# Patient Record
Sex: FEMALE | Race: BLACK OR AFRICAN AMERICAN | NOT HISPANIC OR LATINO | Employment: OTHER | URBAN - METROPOLITAN AREA
[De-identification: names, ages, dates, MRNs, and addresses within clinical notes are randomized per-mention and may not be internally consistent; named-entity substitution may affect disease eponyms.]

---

## 2019-07-12 ENCOUNTER — TELEPHONE (OUTPATIENT)
Dept: GASTROENTEROLOGY | Facility: CLINIC | Age: 69
End: 2019-07-12

## 2019-07-12 NOTE — TELEPHONE ENCOUNTER
Called pt to see if they every had a colonoscopy  She did, back in 2012  Pt does not remember where she had it done, she has tried in the past to get records

## 2019-07-16 ENCOUNTER — OFFICE VISIT (OUTPATIENT)
Dept: GASTROENTEROLOGY | Facility: AMBULARY SURGERY CENTER | Age: 69
End: 2019-07-16
Payer: COMMERCIAL

## 2019-07-16 VITALS
BODY MASS INDEX: 29.45 KG/M2 | RESPIRATION RATE: 16 BRPM | HEART RATE: 61 BPM | DIASTOLIC BLOOD PRESSURE: 60 MMHG | SYSTOLIC BLOOD PRESSURE: 116 MMHG | WEIGHT: 156 LBS | HEIGHT: 61 IN | TEMPERATURE: 97.2 F

## 2019-07-16 DIAGNOSIS — R13.19 INTERMITTENT DYSPHAGIA: ICD-10-CM

## 2019-07-16 DIAGNOSIS — K21.9 GASTROESOPHAGEAL REFLUX DISEASE, ESOPHAGITIS PRESENCE NOT SPECIFIED: ICD-10-CM

## 2019-07-16 DIAGNOSIS — Z12.11 COLON CANCER SCREENING: Primary | ICD-10-CM

## 2019-07-16 PROCEDURE — 99204 OFFICE O/P NEW MOD 45 MIN: CPT | Performed by: INTERNAL MEDICINE

## 2019-07-16 RX ORDER — MECLIZINE HYDROCHLORIDE 25 MG/1
25 TABLET ORAL DAILY PRN
COMMUNITY

## 2019-07-16 RX ORDER — CLONAZEPAM 0.5 MG/1
TABLET, ORALLY DISINTEGRATING ORAL
Refills: 2 | COMMUNITY
Start: 2019-04-18

## 2019-07-16 RX ORDER — SIMETHICONE 125 MG
CAPSULE ORAL
COMMUNITY
End: 2020-07-17 | Stop reason: ALTCHOICE

## 2019-07-16 RX ORDER — CALCIUM CARBONATE/VITAMIN D2 250 MG-125
TABLET ORAL
COMMUNITY
Start: 2019-05-29

## 2019-07-16 RX ORDER — HYDROCODONE BITARTRATE AND ACETAMINOPHEN 5; 325 MG/1; MG/1
TABLET ORAL
COMMUNITY
End: 2019-10-28 | Stop reason: ALTCHOICE

## 2019-07-16 RX ORDER — LEVETIRACETAM 750 MG/1
750 TABLET ORAL 2 TIMES DAILY
COMMUNITY
End: 2020-10-09 | Stop reason: SDUPTHER

## 2019-07-16 RX ORDER — MULTIVIT-MIN/IRON/FOLIC ACID/K 18-600-40
CAPSULE ORAL
COMMUNITY

## 2019-07-16 RX ORDER — CHOLECALCIFEROL (VITAMIN D3) 125 MCG
CAPSULE ORAL
COMMUNITY

## 2019-07-16 RX ORDER — GUAIFENESIN AND CODEINE PHOSPHATE 100; 10 MG/5ML; MG/5ML
SOLUTION ORAL
COMMUNITY
End: 2019-10-28 | Stop reason: ALTCHOICE

## 2019-07-16 RX ORDER — RIBOFLAVIN (VITAMIN B2) 100 MG
TABLET ORAL
COMMUNITY
Start: 2019-05-29

## 2019-07-16 RX ORDER — LAMOTRIGINE 200 MG/1
TABLET ORAL
COMMUNITY
End: 2020-10-05 | Stop reason: SDUPTHER

## 2019-07-16 RX ORDER — VITAMINS A AND D
CAPSULE ORAL
COMMUNITY
End: 2020-07-17 | Stop reason: SDUPTHER

## 2019-07-16 RX ORDER — TRAMADOL HYDROCHLORIDE 50 MG/1
TABLET ORAL
COMMUNITY
End: 2020-07-17 | Stop reason: ALTCHOICE

## 2019-07-16 NOTE — PROGRESS NOTES
Consultation - [facility]  Juan Guerra 80 y o  female MRN: [id number]  Unit/Bed#:  Encounter: [id number]        Consults    ASSESSMENT/PLAN:       1  History of colon polyps-will schedule for increased risk screening colonoscopy  Patient is currently on Eliquis for a flutter, will need to obtain clearance from Cardiology in regards to holding this 2 days prior to the procedure  -    Patient was explained about  the risks and benefits of the procedure  Risks including but not limited to bleeding, infection, perforation were explained in detail  Also explained about less than 100% sensitivity with the exam and other alternatives  2  Intermittent dysphagia to solids-differential includes esophageal dysmotility versus peptic stricture versus Schatzki's ring versus less likely malignancy   -discussed eating small meals, chewing well and following with water   -patient reluctant about starting another medication, will wait endoscopy prior to starting PPI  -avoid NSAIDs   -continue to follow anti-reflux measures   -will plan for EGD to assess for etiology of dysphagia       ______________________________________________________________________    Reason for Consult / Principal Problem: [unfilled]    HPI: Juan Guerra is a 80y o  year old female with history of acid reflux, hypertension, a flutter, on Eliquis, vertigo, history of colon polyps and osteoporosis presents for colon cancer screening evaluation  Patient states that she had a colonoscopy in 2012 and was noted to have polyps, she states that she was recommended repeat colonoscopy at 5 year interval which she has not had  She denies change in bowel habits, hematochezia, abdominal pain or unintentional weight loss  She further endorses that she has had history of acid reflux which was well controlled with Nexium in the past   She states that she stopped taking Nexium several years ago    She states that her heartburn symptoms are relatively well controlled however she does have a intermittent episodes of dysphagia to solids  She states that she notices have bread can often gets stuck in the esophagus  She denies hematemesis, coffee-ground emesis or melena  Most recent blood work was done by her PCP and was notable for normal hemoglobin of 12 7, platelets of 870, WBC of 6 1  Magnesium was 1 8   TSH was 0 63  Her kidney function was normal     Review of Systems: The remainder of the review of systems was negative except for the pertinent positives noted in HPI  Historical Information   Past Medical History:   Diagnosis Date    Atrial flutter ([address] )     Colon polyp     GERD (gastroesophageal reflux disease)     Hypertension     Osteoporosis     Vertigo      Past Surgical History:   Procedure Laterality Date    ECTOPIC PREGNANCY SURGERY       Social History   Social History     Substance and Sexual Activity   Alcohol Use Yes    Comment: social     Social History     Substance and Sexual Activity   Drug Use Never     Social History     Tobacco Use   Smoking Status Never Smoker   Smokeless Tobacco Never Used     Family History   Problem Relation Age of Onset    Stomach cancer Father        Meds/Allergies       (Not in a hospital admission)  No current facility-administered medications for this visit  No Known Allergies    Objective     Blood pressure 116/60, pulse 61, temperature (!) 97 2 °F (36 2 °C), temperature source Tympanic, resp  rate 16, height 5' 1" (1 549 m), weight 70 8 kg (156 lb)  [unfilled]    PHYSICAL EXAM     GEN: well nourished, well developed, no acute distress  HEENT: anicteric, MMM, no cervical or supraclavicular lymphadenopathy  CV: RRR, no m/r/g  CHEST: CTA b/l, no WRR  ABD: +BS, soft, NT/ND, no hepatosplenomegaly  EXT: no c/c/e  SKIN: no rashes,  NEURO: aaox3    Lab Results:   No visits with results within 1 Day(s) from this visit     Latest known visit with results is:   No results found for any previous visit       Imaging Studies: I have personally reviewed pertinent films in PACS

## 2019-07-16 NOTE — LETTER
July 16, 2019     Shamar Muñoz MD  [facility] [id number]    Patient: Lorena Wilson   YOB: 1950   Date of Visit: 7/16/2019       Dear Dr Rebel Ibrahim: Thank you for referring Lorena Wilson to me for evaluation  Below are my notes for this consultation  If you have questions, please do not hesitate to call me  I look forward to following your patient along with you  Sincerely,        Jannet Garcia MD        CC: No Recipients  Jannet Garcia MD  7/16/2019  1:30 PM  Sign at close encounter  Consultation - [address] [facility]  Lorena Wilson 80 y o  female MRN: [id number]  Unit/Bed#:  Encounter: [id number]        Consults    ASSESSMENT/PLAN:       1  History of colon polyps-will schedule for increased risk screening colonoscopy  Patient is currently on Eliquis for a flutter, will need to obtain clearance from Cardiology in regards to holding this 2 days prior to the procedure  -    Patient was explained about  the risks and benefits of the procedure  Risks including but not limited to bleeding, infection, perforation were explained in detail  Also explained about less than 100% sensitivity with the exam and other alternatives  2  Intermittent dysphagia to solids-differential includes esophageal dysmotility versus peptic stricture versus Schatzki's ring versus less likely malignancy   -discussed eating small meals, chewing well and following with water   -patient reluctant about starting another medication, will wait endoscopy prior to starting PPI    -avoid NSAIDs   -continue to follow anti-reflux measures   -will plan for EGD to assess for etiology of dysphagia       ______________________________________________________________________    Reason for Consult / Principal Problem: [unfilled]    HPI: Lorena Wilson is a 80y o  year old female with history of acid reflux, hypertension, a flutter, on Eliquis, vertigo, history of colon polyps and osteoporosis presents for colon cancer screening evaluation  Patient states that she had a colonoscopy in 2012 and was noted to have polyps, she states that she was recommended repeat colonoscopy at 5 year interval which she has not had  She denies change in bowel habits, hematochezia, abdominal pain or unintentional weight loss  She further endorses that she has had history of acid reflux which was well controlled with Nexium in the past   She states that she stopped taking Nexium several years ago  She states that her heartburn symptoms are relatively well controlled however she does have a intermittent episodes of dysphagia to solids  She states that she notices have bread can often gets stuck in the esophagus  She denies hematemesis, coffee-ground emesis or melena  Most recent blood work was done by her PCP and was notable for normal hemoglobin of 12 7, platelets of 931, WBC of 6 1  Magnesium was 1 8   TSH was 0 63  Her kidney function was normal     Review of Systems: The remainder of the review of systems was negative except for the pertinent positives noted in HPI  Historical Information   Past Medical History:   Diagnosis Date    Atrial flutter ([address]     Colon polyp     GERD (gastroesophageal reflux disease)     Hypertension     Osteoporosis     Vertigo      Past Surgical History:   Procedure Laterality Date    ECTOPIC PREGNANCY SURGERY       Social History   Social History     Substance and Sexual Activity   Alcohol Use Yes    Comment: social     Social History     Substance and Sexual Activity   Drug Use Never     Social History     Tobacco Use   Smoking Status Never Smoker   Smokeless Tobacco Never Used     Family History   Problem Relation Age of Onset    Stomach cancer Father        Meds/Allergies       (Not in a hospital admission)  No current facility-administered medications for this visit          No Known Allergies    Objective     Blood pressure 116/60, pulse 61, temperature Fransico Candelario ) 97 2 °F (36 2 °C), temperature source Tympanic, resp  rate 16, height 5' 1" (1 549 m), weight 70 8 kg (156 lb)  [unfilled]    PHYSICAL EXAM     GEN: well nourished, well developed, no acute distress  HEENT: anicteric, MMM, no cervical or supraclavicular lymphadenopathy  CV: RRR, no m/r/g  CHEST: CTA b/l, no WRR  ABD: +BS, soft, NT/ND, no hepatosplenomegaly  EXT: no c/c/e  SKIN: no rashes,  NEURO: aaox3    Lab Results:   No visits with results within 1 Day(s) from this visit  Latest known visit with results is:   No results found for any previous visit       Imaging Studies: I have personally reviewed pertinent films in PACS

## 2019-07-18 ENCOUNTER — TELEPHONE (OUTPATIENT)
Dept: GASTROENTEROLOGY | Facility: AMBULARY SURGERY CENTER | Age: 69
End: 2019-07-18

## 2019-09-23 ENCOUNTER — RX ONLY (RX ONLY)
Age: 69
End: 2019-09-23

## 2019-09-23 ENCOUNTER — DOCTOR'S OFFICE (OUTPATIENT)
Dept: URBAN - METROPOLITAN AREA CLINIC 137 | Facility: CLINIC | Age: 69
Setting detail: OPHTHALMOLOGY
End: 2019-09-23
Payer: COMMERCIAL

## 2019-09-23 DIAGNOSIS — H52.13: ICD-10-CM

## 2019-09-23 DIAGNOSIS — H52.4: ICD-10-CM

## 2019-09-23 DIAGNOSIS — H52.223: ICD-10-CM

## 2019-09-23 DIAGNOSIS — H18.413: ICD-10-CM

## 2019-09-23 PROCEDURE — 92310 CONTACT LENS FITTING OU: CPT | Performed by: OPTOMETRIST

## 2019-09-23 PROCEDURE — 92002 INTRM OPH EXAM NEW PATIENT: CPT | Performed by: OPTOMETRIST

## 2019-09-23 PROCEDURE — 92015 DETERMINE REFRACTIVE STATE: CPT | Performed by: OPTOMETRIST

## 2019-09-23 ASSESSMENT — KERATOMETRY
OD_K1POWER_DIOPTERS: 43.50
OS_K1POWER_DIOPTERS: 42.75
OD_K2POWER_DIOPTERS: 43.50
OD_AXISANGLE_DEGREES: 90
OS_K2POWER_DIOPTERS: 43.75
OS_AXISANGLE_DEGREES: 151

## 2019-09-23 ASSESSMENT — REFRACTION_CURRENTRX
OS_OVR_VA: 20/
OD_ADD: +2.00
OD_OVR_VA: 20/
OS_ADD: +2.00
OD_VPRISM_DIRECTION: PROGS
OD_SPHERE: -3.00
OD_CYLINDER: -0.50
OS_VPRISM_DIRECTION: PROGS
OS_ADD: +1.25
OS_OVR_VA: 20/
OS_AXIS: 167
OS_SPHERE: -5.50
OD_AXIS: 19
OD_VPRISM_DIRECTION: PROGS
OS_VPRISM_DIRECTION: PROGS
OS_CYLINDER: -1.25
OD_SPHERE: -4.25
OD_OVR_VA: 20/
OD_AXIS: 71
OS_CYLINDER: -1.75
OS_SPHERE: -4.50
OS_AXIS: 146
OD_OVR_VA: 20/
OD_CYLINDER: -1.50
OS_OVR_VA: 20/
OD_ADD: +1.25

## 2019-09-23 ASSESSMENT — CONFRONTATIONAL VISUAL FIELD TEST (CVF)
OD_FINDINGS: FULL
OS_FINDINGS: FULL

## 2019-09-23 ASSESSMENT — VISUAL ACUITY
OS_BCVA: 20/50
OD_BCVA: 20/300

## 2019-09-23 ASSESSMENT — REFRACTION_MANIFEST
OS_SPHERE: -4.25
OU_VA: 20/
OS_ADD: +2.50
OS_VA2: 20/
OS_VA2: 20/
OS_VA1: 20/20
OD_VA3: 20/
OD_ADD: +2.50
OD_CYLINDER: -0.25
OD_VA3: 20/
OD_VA1: 20/
OS_CYLINDER: -1.25
OS_VA3: 20/
OD_VA1: 20/20
OD_VA2: 20/
OS_VA3: 20/
OD_AXIS: 90
OS_AXIS: 130
OD_VA2: 20/
OU_VA: 20/
OD_SPHERE: -3.50
OS_VA1: 20/

## 2019-09-23 ASSESSMENT — REFRACTION_AUTOREFRACTION
OS_SPHERE: -4.50
OD_CYLINDER: -0.25
OS_CYLINDER: -1.50
OS_AXIS: 138
OD_AXIS: 98
OD_SPHERE: -4.00

## 2019-09-23 ASSESSMENT — SPHEQUIV_DERIVED
OS_SPHEQUIV: -5.25
OD_SPHEQUIV: -3.625
OS_SPHEQUIV: -4.875
OD_SPHEQUIV: -4.125

## 2019-09-23 ASSESSMENT — AXIALLENGTH_DERIVED
OS_AL: 25.7666
OD_AL: 25.0942
OS_AL: 25.9421
OD_AL: 25.3165

## 2019-10-14 ENCOUNTER — ANESTHESIA EVENT (OUTPATIENT)
Dept: GASTROENTEROLOGY | Facility: AMBULARY SURGERY CENTER | Age: 69
End: 2019-10-14

## 2019-10-23 ENCOUNTER — TELEPHONE (OUTPATIENT)
Dept: GASTROENTEROLOGY | Facility: AMBULARY SURGERY CENTER | Age: 69
End: 2019-10-23

## 2019-10-23 NOTE — TELEPHONE ENCOUNTER
Patients GI provider:  Dr ANDERSEN     Number to return call: [phone]    Reason for call: Pt called requesting prep instructions        Scheduled procedure/appointment date if applicable: Apt/procedure 10/28/19

## 2019-10-27 RX ORDER — LIDOCAINE HYDROCHLORIDE 10 MG/ML
0.5 INJECTION, SOLUTION EPIDURAL; INFILTRATION; INTRACAUDAL; PERINEURAL ONCE AS NEEDED
Status: CANCELLED | OUTPATIENT
Start: 2019-10-27

## 2019-10-27 RX ORDER — SODIUM CHLORIDE 9 MG/ML
125 INJECTION, SOLUTION INTRAVENOUS CONTINUOUS
Status: CANCELLED | OUTPATIENT
Start: 2019-10-27

## 2019-10-28 ENCOUNTER — HOSPITAL ENCOUNTER (OUTPATIENT)
Dept: GASTROENTEROLOGY | Facility: AMBULARY SURGERY CENTER | Age: 69
Setting detail: OUTPATIENT SURGERY
Discharge: HOME | End: 2019-10-28
Attending: INTERNAL MEDICINE | Admitting: INTERNAL MEDICINE
Payer: COMMERCIAL

## 2019-10-28 ENCOUNTER — ANESTHESIA (OUTPATIENT)
Dept: GASTROENTEROLOGY | Facility: AMBULARY SURGERY CENTER | Age: 69
End: 2019-10-28

## 2019-10-28 VITALS
OXYGEN SATURATION: 98 % | RESPIRATION RATE: 18 BRPM | WEIGHT: 151 LBS | BODY MASS INDEX: 29.64 KG/M2 | HEART RATE: 52 BPM | DIASTOLIC BLOOD PRESSURE: 78 MMHG | SYSTOLIC BLOOD PRESSURE: 151 MMHG | HEIGHT: 60 IN | TEMPERATURE: 97.6 F

## 2019-10-28 DIAGNOSIS — Z12.11 COLON CANCER SCREENING: ICD-10-CM

## 2019-10-28 DIAGNOSIS — K21.9 GASTROESOPHAGEAL REFLUX DISEASE, ESOPHAGITIS PRESENCE NOT SPECIFIED: ICD-10-CM

## 2019-10-28 DIAGNOSIS — B37.81 CANDIDA ESOPHAGITIS (HCC): Primary | ICD-10-CM

## 2019-10-28 PROCEDURE — 88342 IMHCHEM/IMCYTCHM 1ST ANTB: CPT | Performed by: PATHOLOGY

## 2019-10-28 PROCEDURE — 43239 EGD BIOPSY SINGLE/MULTIPLE: CPT | Performed by: INTERNAL MEDICINE

## 2019-10-28 PROCEDURE — 45380 COLONOSCOPY AND BIOPSY: CPT | Performed by: INTERNAL MEDICINE

## 2019-10-28 PROCEDURE — 88305 TISSUE EXAM BY PATHOLOGIST: CPT | Performed by: PATHOLOGY

## 2019-10-28 RX ORDER — OMEPRAZOLE 40 MG/1
40 CAPSULE, DELAYED RELEASE ORAL DAILY
Qty: 30 CAPSULE | Refills: 3 | Status: SHIPPED | OUTPATIENT
Start: 2019-10-28 | End: 2019-10-28 | Stop reason: SDUPTHER

## 2019-10-28 RX ORDER — SODIUM CHLORIDE, SODIUM LACTATE, POTASSIUM CHLORIDE, CALCIUM CHLORIDE 600; 310; 30; 20 MG/100ML; MG/100ML; MG/100ML; MG/100ML
INJECTION, SOLUTION INTRAVENOUS CONTINUOUS PRN
Status: DISCONTINUED | OUTPATIENT
Start: 2019-10-28 | End: 2019-10-28 | Stop reason: SURG

## 2019-10-28 RX ORDER — PROPOFOL 10 MG/ML
INJECTION, EMULSION INTRAVENOUS CONTINUOUS PRN
Status: DISCONTINUED | OUTPATIENT
Start: 2019-10-28 | End: 2019-10-28 | Stop reason: SURG

## 2019-10-28 RX ORDER — SODIUM CHLORIDE 9 MG/ML
INJECTION, SOLUTION INTRAVENOUS CONTINUOUS PRN
Status: DISCONTINUED | OUTPATIENT
Start: 2019-10-28 | End: 2019-10-28 | Stop reason: SURG

## 2019-10-28 RX ORDER — FLECAINIDE ACETATE 100 MG/1
100 TABLET ORAL 2 TIMES DAILY
COMMUNITY

## 2019-10-28 RX ORDER — PROPOFOL 10 MG/ML
INJECTION, EMULSION INTRAVENOUS AS NEEDED
Status: DISCONTINUED | OUTPATIENT
Start: 2019-10-28 | End: 2019-10-28 | Stop reason: SURG

## 2019-10-28 RX ADMIN — PROPOFOL 50 MG: 10 INJECTION, EMULSION INTRAVENOUS at 11:22

## 2019-10-28 RX ADMIN — PROPOFOL 20 MG: 10 INJECTION, EMULSION INTRAVENOUS at 11:27

## 2019-10-28 RX ADMIN — SODIUM CHLORIDE: 9 INJECTION, SOLUTION INTRAVENOUS at 11:31

## 2019-10-28 RX ADMIN — SODIUM CHLORIDE, SODIUM LACTATE, POTASSIUM CHLORIDE, AND CALCIUM CHLORIDE: .6; .31; .03; .02 INJECTION, SOLUTION INTRAVENOUS at 09:42

## 2019-10-28 RX ADMIN — PROPOFOL 100 MCG/KG/MIN: 10 INJECTION, EMULSION INTRAVENOUS at 11:18

## 2019-10-28 RX ADMIN — PROPOFOL 50 MG: 10 INJECTION, EMULSION INTRAVENOUS at 11:17

## 2019-10-28 NOTE — H&P
History and Physical -  [facility]  Donna Schaefer 80 y o  female MRN: [id number]    HPI: Donna Schaefer is a 80y o  year old female who presents for colon cancer screening, has history of colon polyps  She also has symptoms of GERD and dysphagia  Review of Systems    Historical Information   Past Medical History:   Diagnosis Date    Atrial flutter ([address] )     Colon polyp     GERD (gastroesophageal reflux disease)     Hypertension     Osteoporosis     Vertigo      Past Surgical History:   Procedure Laterality Date    ECTOPIC PREGNANCY SURGERY       Social History   Social History     Substance and Sexual Activity   Alcohol Use Yes    Comment: social     Social History     Substance and Sexual Activity   Drug Use Never     Social History     Tobacco Use   Smoking Status Never Smoker   Smokeless Tobacco Never Used     Family History   Problem Relation Age of Onset    Stomach cancer Father        Meds/Allergies       (Not in a hospital admission)    No Known Allergies    Objective     /71   Pulse (!) 54   Temp (!) 97 °F (36 1 °C) (Temporal)   Resp 16   Ht 5' (1 524 m)   Wt 68 5 kg (151 lb)   SpO2 100%   Breastfeeding? No   BMI 29 49 kg/m²       PHYSICAL EXAM    Gen: NAD  CV: RRR  CHEST: Clear  ABD: soft, NT/ND  EXT: no edema  Neuro: AAO      ASSESSMENT/PLAN:  This is a 80y o  year old female here for evaluation of dysphagia symptoms, GERD symptoms and colon cancer screening  PLAN:   Procedure:  EGD and colonoscopy

## 2019-10-28 NOTE — ANESTHESIA POSTPROCEDURE EVALUATION
Post-Op Assessment Note    CV Status:  Stable  Pain Score: 0    Pain management: adequate     Mental Status:  Alert and awake   Hydration Status:  Euvolemic   PONV Controlled:  None   Airway Patency:  Patent    Staff: Anesthesiologist           BP      Temp      Pulse    Resp      SpO2

## 2019-10-28 NOTE — ANESTHESIA PREPROCEDURE EVALUATION
Review of Systems/Medical History  Patient summary reviewed    No history of anesthetic complications     Cardiovascular  Hypertension , Dysrhythmias , atrial flutter,   Comment: Hx Atrial flutter,  Pulmonary       GI/Hepatic    GERD , Bowel prep            Endo/Other     GYN       Hematology   Musculoskeletal       Neurology   Psychology           Physical Exam    Airway    Mallampati score: I  TM Distance: >3 FB  Neck ROM: full     Dental   No notable dental hx     Cardiovascular      Pulmonary      Other Findings  Some broken upper teeth      Anesthesia Plan  ASA Score- 2     Anesthesia Type- IV sedation with anesthesia with ASA Monitors  Additional Monitors:   Airway Plan:         Plan Factors-  Patient did not smoke on day of surgery  Induction-     Postoperative Plan-     Informed Consent- Anesthetic plan and risks discussed with patient  I personally reviewed this patient with the CRNA  Discussed and agreed on the Anesthesia Plan with the CRNA  Florin Irby

## 2019-10-29 ENCOUNTER — TELEPHONE (OUTPATIENT)
Dept: GASTROENTEROLOGY | Facility: CLINIC | Age: 69
End: 2019-10-29

## 2019-10-29 ENCOUNTER — TELEPHONE (OUTPATIENT)
Dept: GASTROENTEROLOGY | Facility: AMBULARY SURGERY CENTER | Age: 69
End: 2019-10-29

## 2019-10-29 RX ORDER — OMEPRAZOLE 40 MG/1
CAPSULE, DELAYED RELEASE ORAL
Qty: 90 CAPSULE | Refills: 3 | Status: SHIPPED | OUTPATIENT
Start: 2019-10-29 | End: 2020-07-17 | Stop reason: ALTCHOICE

## 2019-10-29 NOTE — TELEPHONE ENCOUNTER
Patient is s/p EGD/Colonoscopy yesterday  Colonoscopy showed diminutive polyp, mild diverticulosis small internal hemorrhoids  EGD showed moderate gastritis, diminutive hiatal hernia, probable candida esophagitis  She called to report left-sided abdominal pain, "5-6", near her waist radiating to her back feeling similar to a muscle strain  She is tolerating solid foods but denies passing gas or bowel movement since colonoscopy  Denies n/v, fever  She feels pain is related to dose of omeprazole she started last night  I suggested she trial holding omeprazole and continue to monitor as well as increase fluid intake  If pain does not improve, she does not pass gas or have bowel movement and develops n/v to go to ED in the meantime  I will check on her tomorrow

## 2019-10-29 NOTE — TELEPHONE ENCOUNTER
Spoke to patient and she had a colonoscopy done yesterday with Dr Kc Bare  She is having abdominal pain that is going to her back  Please call patient ASAP      Thank you,  Samuel Vasquez

## 2019-10-29 NOTE — TELEPHONE ENCOUNTER
Dr Linda Christine spoke to patient  Pain may be due to  positioning during colonoscopy  Recommend  increase fluids, take tylenol as needed, use heating pad and follow bland diet  If symptoms worsen, recommend ED assessment  Nurse to check on patient tomorrow

## 2019-10-29 NOTE — TELEPHONE ENCOUNTER
I talked to patient  She has soreness in the her lower abdomen and side almost feels musculoskeletal  No nausea, vomiting, diarrhea, fever, chills  The pain is mild  She did not have pain yesterday after the procedure, she noticed it this morning  I discussed that this may be more of a strain  Given it is not severe I would recommend resting tonight, using a heating pad, taking tylenol if needed  She will update us  If the pain worsens or she develops vomiting she will go to the ED   Thanks

## 2019-10-29 NOTE — TELEPHONE ENCOUNTER
As discussed in the office, I agree with below plan to increase fluid intake, bland diet  If symptoms worsen, go to the ED

## 2019-10-29 NOTE — TELEPHONE ENCOUNTER
Patients GI provider:  Dr Julieth Johnson    Number to return call: [phone]    Reason for call: Pt calling to report symptoms after her colonoscopy that she had yesterday   Please return her call     Scheduled procedure/appointment date if applicable: Apt/procedure - n/a

## 2019-11-12 ENCOUNTER — TELEPHONE (OUTPATIENT)
Dept: GASTROENTEROLOGY | Facility: CLINIC | Age: 69
End: 2019-11-12

## 2019-11-12 DIAGNOSIS — B37.81 CANDIDA ESOPHAGITIS (HCC): Primary | ICD-10-CM

## 2019-11-12 RX ORDER — FLUCONAZOLE 100 MG/1
TABLET ORAL
Qty: 15 TABLET | Refills: 0 | Status: SHIPPED | OUTPATIENT
Start: 2019-11-12 | End: 2019-11-26

## 2019-11-12 NOTE — TELEPHONE ENCOUNTER
Attempted to contact pt via telephone, lmom for pt to call office  Letter also sent    Recall and hm

## 2019-11-12 NOTE — TELEPHONE ENCOUNTER
----- Message from Christian Yañez MD sent at 11/12/2019  8:19 AM EST -----  Please inform the patient that there is no evidence of celiac disease, gastric biopsies are negative for H pylori  There is evidence of Candida esophagitis a based on esophageal biopsy  I will send over fluconazole to her pharmacy  She needs to continue PPI(I prescribed omeprazole 40 mg) on daily basis  No need for repeat EGD at this time  Follow-up in the office in 2-3 months with PA  Polyp in the colon was benign mucosa  Would recommend repeat colonoscopy in 5 years

## 2019-11-12 NOTE — TELEPHONE ENCOUNTER
Pt called back and is aware of results  Recall and hm set    New follow up apt made  Pt is aware to  new med

## 2020-02-11 ENCOUNTER — DOCTOR'S OFFICE (OUTPATIENT)
Dept: URBAN - METROPOLITAN AREA CLINIC 137 | Facility: CLINIC | Age: 70
Setting detail: OPHTHALMOLOGY
End: 2020-02-11

## 2020-02-11 DIAGNOSIS — H52.223: ICD-10-CM

## 2020-02-11 DIAGNOSIS — H18.413: ICD-10-CM

## 2020-02-11 DIAGNOSIS — H52.13: ICD-10-CM

## 2020-02-11 PROCEDURE — CLFUP CONTACT LENS FOLLOW-UP: Performed by: OPTOMETRIST

## 2020-02-11 ASSESSMENT — REFRACTION_CURRENTRX
OD_CYLINDER: -1.50
OS_VPRISM_DIRECTION: PROGS
OD_AXIS: 19
OD_VPRISM_DIRECTION: PROGS
OD_OVR_VA: 20/
OS_CYLINDER: -1.25
OD_VPRISM_DIRECTION: PROGS
OS_SPHERE: -5.50
OD_ADD: +2.00
OD_SPHERE: -4.25
OS_VPRISM_DIRECTION: PROGS
OS_ADD: +1.25
OS_AXIS: 167
OS_OVR_VA: 20/
OD_ADD: +1.25
OS_SPHERE: -4.50
OD_OVR_VA: 20/
OS_ADD: +2.00
OD_AXIS: 71
OS_AXIS: 146
OD_SPHERE: -3.00
OS_OVR_VA: 20/
OD_CYLINDER: -0.50
OS_CYLINDER: -1.75

## 2020-02-11 ASSESSMENT — SPHEQUIV_DERIVED
OD_SPHEQUIV: -4.125
OS_SPHEQUIV: -4.875
OD_SPHEQUIV: -3.625
OS_SPHEQUIV: -5.25

## 2020-02-11 ASSESSMENT — CONFRONTATIONAL VISUAL FIELD TEST (CVF)
OS_FINDINGS: FULL
OD_FINDINGS: FULL

## 2020-02-11 ASSESSMENT — REFRACTION_MANIFEST
OS_CYLINDER: -1.25
OS_SPHERE: -4.25
OS_ADD: +2.50
OS_AXIS: 130
OD_CYLINDER: -0.25
OS_VA1: 20/20
OD_VA1: 20/20
OD_ADD: +2.50
OD_SPHERE: -3.50
OD_AXIS: 90

## 2020-02-11 ASSESSMENT — REFRACTION_AUTOREFRACTION
OD_SPHERE: -4.00
OS_SPHERE: -4.50
OD_AXIS: 98
OS_CYLINDER: -1.50
OS_AXIS: 138
OD_CYLINDER: -0.25

## 2020-02-11 ASSESSMENT — AXIALLENGTH_DERIVED
OS_AL: 25.7666
OD_AL: 25.0942
OD_AL: 25.3165
OS_AL: 25.9421

## 2020-02-11 ASSESSMENT — KERATOMETRY
OD_K2POWER_DIOPTERS: 43.50
OD_AXISANGLE_DEGREES: 90
OD_K1POWER_DIOPTERS: 43.50
OS_K2POWER_DIOPTERS: 43.75
OS_K1POWER_DIOPTERS: 42.75
OS_AXISANGLE_DEGREES: 151

## 2020-02-11 ASSESSMENT — VISUAL ACUITY
OS_BCVA: 20/30-2
OD_BCVA: 20/400

## 2020-02-12 ENCOUNTER — OFFICE VISIT (OUTPATIENT)
Dept: GASTROENTEROLOGY | Facility: AMBULARY SURGERY CENTER | Age: 70
End: 2020-02-12
Payer: COMMERCIAL

## 2020-02-12 VITALS
BODY MASS INDEX: 30.15 KG/M2 | WEIGHT: 153.6 LBS | HEART RATE: 64 BPM | HEIGHT: 60 IN | DIASTOLIC BLOOD PRESSURE: 60 MMHG | SYSTOLIC BLOOD PRESSURE: 120 MMHG | TEMPERATURE: 97.8 F

## 2020-02-12 DIAGNOSIS — K21.9 GASTROESOPHAGEAL REFLUX DISEASE, ESOPHAGITIS PRESENCE NOT SPECIFIED: Primary | ICD-10-CM

## 2020-02-12 DIAGNOSIS — B37.81 CANDIDAL ESOPHAGITIS (HCC): ICD-10-CM

## 2020-02-12 PROCEDURE — 99213 OFFICE O/P EST LOW 20 MIN: CPT | Performed by: PHYSICIAN ASSISTANT

## 2020-02-12 NOTE — PROGRESS NOTES
[facility] - Outpatient Follow-up Note  Karl Jan 77 y o  female MRN: [id number]  Encounter: [id number]    ASSESSMENT AND PLAN:      Candidal esophagitis  -treated with fluconazole course  -no further dysphagia    GERD  -continue omeprazole  -reviewed lifestyle/dietary modifications  -famotidine prn  ______________________________________________________________________    SUBJECTIVE: 76 yo female presents for follow up  She underwent an EGD and a colonoscopy a few months ago  Colonoscopy with polyps removed, recall in 5 years  EGD showed candidal esophagitis, she was prescribed nystatin and fluconazole which she completed  She also has been taking omeprazole for GERD  She reports doing well generally but has breakthrough symptoms when she eats spicy foods  REVIEW OF SYSTEMS IS OTHERWISE NEGATIVE        Historical Information   Past Medical History:   Diagnosis Date    Atrial flutter ([address]     Colon polyp     GERD (gastroesophageal reflux disease)     Hypertension     Osteoporosis     Vertigo      Past Surgical History:   Procedure Laterality Date    ECTOPIC PREGNANCY SURGERY       Social History   Social History     Substance and Sexual Activity   Alcohol Use Yes    Comment: social     Social History     Substance and Sexual Activity   Drug Use Never     Social History     Tobacco Use   Smoking Status Never Smoker   Smokeless Tobacco Never Used     Family History   Problem Relation Age of Onset    Stomach cancer Father        Meds/Allergies       Current Outpatient Medications:     apixaban (ELIQUIS) 5 mg    Ascorbic Acid (VITAMIN C) 500 MG/5ML LIQD    Calcium Citrate-Vitamin D 200-125 MG-UNIT TABS    clonazePAM (KlonoPIN) 0 5 MG disintegrating tablet    dimenhyDRINATE 25 MG CHEW    flecainide (TAMBOCOR) 100 mg tablet    lactase (LACTAID) 3,000 units tablet    lamoTRIgine (LaMICtal) 200 MG tablet    levETIRAcetam (KEPPRA) 750 mg tablet    meclizine (ANTIVERT) 25 mg tablet    metoprolol tartrate (LOPRESSOR) 25 mg tablet    metoprolol tartrate (LOPRESSOR) 25 mg tablet    Multiple Vitamins-Minerals (ONE-A-DAY WOMENS 50+ ADVANTAGE PO)    nystatin (MYCOSTATIN) 500,000 units/5 mL suspension    omeprazole (PriLOSEC) 40 MG capsule    Potassium Gluconate 595 MG CAPS    simethicone ([employer] GAS RELIEF EXTRA STRENGTH) 125 MG CAPS    traMADol (ULTRAM) 50 mg tablet    Vitamins A & D (VITAMIN A & D) 82851-207 units TABS    Vitamins A & D 5000-400 units CAPS    No Known Allergies        Objective     not currently breastfeeding  There is no height or weight on file to calculate BMI  PHYSICAL EXAM:      General Appearance:   Alert, cooperative, no distress   HEENT:   Normocephalic, atraumatic, anicteric      Neck:  Supple, symmetrical, trachea midline   Lungs:   Clear to auscultation bilateraly   Heart[de-identified]   Regular rate and rhythm; no murmur, rub, or gallop  Abdomen:   Soft, non-tender, non-distended; normal bowel sounds                              Lab Results:   No visits with results within 1 Day(s) from this visit     Latest known visit with results is:   Hospital Outpatient Visit on 10/28/2019   Component Date Value    Case Report 10/28/2019                      Value:Surgical Pathology Report                         Case: [id number]                                   Authorizing Provider:  Debi Renteria MD       Collected:           10/28/2019 1125              Ordering Location:     [facility]        Received:            10/28/2019 69 [facility] Endoscopy                                                           Pathologist:           Susanna Landon MD                                                         Specimens:   A) - Duodenum, duodenum bx                                                                          B) - Stomach, gastric bx                                                                            C) - Esophagus, esophagus bx                                                                        D) - Polyp, Colorectal, sigmoid polyp cold forcep                                          Final Diagnosis 10/28/2019                      Value: This result contains rich text formatting which cannot be displayed here   Additional Information 10/28/2019                      Value: This result contains rich text formatting which cannot be displayed here  [facility] Description 10/28/2019                      Value: This result contains rich text formatting which cannot be displayed here   Clinical Information 10/28/2019                      Value:R/o celiac         Radiology Results:   No results found

## 2020-02-24 ENCOUNTER — OPTICAL OFFICE (OUTPATIENT)
Dept: URBAN - METROPOLITAN AREA CLINIC 146 | Facility: CLINIC | Age: 70
Setting detail: OPHTHALMOLOGY
End: 2020-02-24
Payer: COMMERCIAL

## 2020-02-24 DIAGNOSIS — H52.223: ICD-10-CM

## 2020-02-24 PROCEDURE — S0500 DISPOS CONT LENS: HCPCS | Performed by: OPTOMETRIST

## 2020-05-05 ENCOUNTER — TELEPHONE (OUTPATIENT)
Dept: INTERNAL MEDICINE CLINIC | Facility: CLINIC | Age: 70
End: 2020-05-05

## 2020-06-09 DIAGNOSIS — I48.92 ATRIAL FLUTTER, UNSPECIFIED TYPE (HCC): ICD-10-CM

## 2020-06-09 DIAGNOSIS — I10 HYPERTENSION, ESSENTIAL: Primary | ICD-10-CM

## 2020-06-09 RX ORDER — METOPROLOL SUCCINATE 50 MG/1
50 TABLET, EXTENDED RELEASE ORAL DAILY
Qty: 90 TABLET | Refills: 0 | Status: SHIPPED | OUTPATIENT
Start: 2020-06-09 | End: 2020-10-16 | Stop reason: SDUPTHER

## 2020-06-17 ENCOUNTER — TELEMEDICINE (OUTPATIENT)
Dept: INTERNAL MEDICINE CLINIC | Facility: CLINIC | Age: 70
End: 2020-06-17
Payer: COMMERCIAL

## 2020-06-17 DIAGNOSIS — G40.909 SEIZURE DISORDER (HCC): ICD-10-CM

## 2020-06-17 DIAGNOSIS — I48.92 ATRIAL FLUTTER, UNSPECIFIED TYPE (HCC): Primary | ICD-10-CM

## 2020-06-17 PROCEDURE — 99213 OFFICE O/P EST LOW 20 MIN: CPT | Performed by: INTERNAL MEDICINE

## 2020-06-25 ENCOUNTER — TELEPHONE (OUTPATIENT)
Dept: OTHER | Facility: OTHER | Age: 70
End: 2020-06-25

## 2020-06-26 ENCOUNTER — TELEPHONE (OUTPATIENT)
Dept: NEUROLOGY | Facility: CLINIC | Age: 70
End: 2020-06-26

## 2020-07-17 ENCOUNTER — CONSULT (OUTPATIENT)
Dept: CARDIOLOGY CLINIC | Facility: CLINIC | Age: 70
End: 2020-07-17
Payer: COMMERCIAL

## 2020-07-17 VITALS
HEART RATE: 61 BPM | BODY MASS INDEX: 27.85 KG/M2 | TEMPERATURE: 97 F | HEIGHT: 61 IN | DIASTOLIC BLOOD PRESSURE: 70 MMHG | WEIGHT: 147.5 LBS | SYSTOLIC BLOOD PRESSURE: 112 MMHG

## 2020-07-17 DIAGNOSIS — I48.92 ATRIAL FLUTTER, UNSPECIFIED TYPE (HCC): Primary | ICD-10-CM

## 2020-07-17 PROCEDURE — 3008F BODY MASS INDEX DOCD: CPT | Performed by: INTERNAL MEDICINE

## 2020-07-17 PROCEDURE — 3078F DIAST BP <80 MM HG: CPT | Performed by: INTERNAL MEDICINE

## 2020-07-17 PROCEDURE — 3074F SYST BP LT 130 MM HG: CPT | Performed by: INTERNAL MEDICINE

## 2020-07-17 PROCEDURE — 93000 ELECTROCARDIOGRAM COMPLETE: CPT | Performed by: INTERNAL MEDICINE

## 2020-07-17 PROCEDURE — 99205 OFFICE O/P NEW HI 60 MIN: CPT | Performed by: INTERNAL MEDICINE

## 2020-07-17 NOTE — PROGRESS NOTES
EPS Consultation/New Patient Evaluation - Alba Fish 77 y o  female MRN: [id number]        CC/HPI:   Alba Fish is a 77 y o  female who presents to the office today for an EP consultation  She has a history of Vertigo, hypertension, and GERD  Patient reports being diagnosed with atrial flutter and was cardioverted back normal sinus rhythm around August 2018 at [facility]  She experienced palpitations prior to Dr Onel Huertas increasing her metoprolol and she was doing well  Although she the past couple 3 weeks she start experiencing them again but this time it feels like a skip beat or a one time thump  There is a family history of heart disease, her sisters, brother, and mother have atrial flutter as well  Patient's daughter is in the [employer] and she will be moving to [place] at the end of this month  ROS:   Review of Systems   Constitution: Negative  HENT: Negative  Eyes: Negative for blurred vision and double vision  Cardiovascular: Positive for palpitations  Negative for chest pain, dyspnea on exertion, near-syncope and syncope  Respiratory: Negative for cough, shortness of breath and wheezing  Endocrine: Negative  Hematologic/Lymphatic: Negative  Skin: Negative  Musculoskeletal: Negative  Gastrointestinal: Negative  Genitourinary: Negative  Neurological: Negative  Psychiatric/Behavioral: Negative  Allergic/Immunologic: Negative  Objective:     Vitals: Blood pressure 112/70, pulse 61, temperature (!) 97 °F (36 1 °C), temperature source Tympanic, height 5' 1" (1 549 m), weight 66 9 kg (147 lb 8 oz), not currently breastfeeding , Body mass index is 27 87 kg/m²  ,        Physical Exam:    GEN: Alba Fish appears well, alert and oriented x 3, pleasant and cooperative   HEENT: pupils equal, round, and reactive to light; extraocular muscles intact  NECK: supple, no carotid bruits   HEART: regular rhythm, normal S1 and S2, no murmurs, clicks, gallops or rubs   LUNGS: clear to auscultation bilaterally; no wheezes, rales, or rhonchi   ABDOMEN: normal bowel sounds, soft, no tenderness, no distention  EXTREMITIES: peripheral pulses normal; no clubbing, cyanosis, or edema  NEURO: no focal findings   SKIN: normal without suspicious lesions on exposed skin    Medications:      Current Outpatient Medications:     apixaban (Eliquis) 5 mg, Take 1 tablet (5 mg total) by mouth 2 (two) times a day, Disp: 180 tablet, Rfl: 0    Ascorbic Acid (VITAMIN C) 500 MG CAPS, , Disp: , Rfl:     Calcium Citrate-Vitamin D 200-125 MG-UNIT TABS, , Disp: , Rfl:     clonazePAM (KlonoPIN) 0 5 MG disintegrating tablet, TK 1 T PO Q 2 H PRF SEIZURE AURAS   DO NOT EXCEED 3 TS PER WEEK, Disp: , Rfl: 2    flecainide (TAMBOCOR) 100 mg tablet, Take 100 mg by mouth 2 (two) times a day, Disp: , Rfl:     lactase (LACTAID) 3,000 units tablet, dairy digestive supplement l, Disp: , Rfl:     lamoTRIgine (LaMICtal) 200 MG tablet, lamotrigine 200 mg tablet, Disp: , Rfl:     levETIRAcetam (KEPPRA) 750 mg tablet, Take 750 mg by mouth 2 (two) times a day , Disp: , Rfl:     meclizine (ANTIVERT) 25 mg tablet, 25 mg daily as needed , Disp: , Rfl:     metoprolol succinate (TOPROL-XL) 50 mg 24 hr tablet, Take 1 tablet (50 mg total) by mouth daily, Disp: 90 tablet, Rfl: 0    Multiple Vitamins-Minerals (ONE-A-DAY WOMENS 50+ ADVANTAGE PO), , Disp: , Rfl:     Vitamins A & D (VITAMIN A & D) 89547-067 units TABS, , Disp: , Rfl:      Family History   Problem Relation Age of Onset    Stomach cancer Father     Asthma Father     Diabetes Mother     Arthritis Mother     Asthma Sister     Arthritis Sister     Prostate cancer Brother     Throat cancer Brother     Prostate cancer Brother      Social History     Socioeconomic History    Marital status: Legally      Spouse name: Not on file    Number of children: Not on file    Years of education: post graduate    Highest education level: Not on file Occupational History    Not on file   Social Needs    Financial resource strain: Not on file    Food insecurity:     Worry: Not on file     Inability: Not on file    Transportation needs:     Medical: Not on file     Non-medical: Not on file   Tobacco Use    Smoking status: Never Smoker    Smokeless tobacco: Never Used   Substance and Sexual Activity    Alcohol use: Yes     Comment: social    Drug use: Never    Sexual activity: Not on file   Lifestyle    Physical activity:     Days per week: Not on file     Minutes per session: Not on file    Stress: Not on file   Relationships    Social connections:     Talks on phone: Not on file     Gets together: Not on file     Attends Tenriism service: Not on file     Active member of club or organization: Not on file     Attends meetings of clubs or organizations: Not on file     Relationship status: Not on file    Intimate partner violence:     Fear of current or ex partner: Not on file     Emotionally abused: Not on file     Physically abused: Not on file     Forced sexual activity: Not on file   Other Topics Concern    Not on file   Social History Narrative    · Do you currently or have you served in the [employer]:   No      · Were you activated, into active duty, as a member of the [employer] or as a Reservist:   No      · Exercise level: Moderate      · Diet:   Regular      · General stress level:   Low      · Caffeine intake:   Occasional      · Chewing tobacco:   none      · Guns present in home:   No      · Seat belts used routinely:   Yes      · Smoke alarm in home:    Yes      · Advance directive:   No      · Tobacco cessation counseling provided date:       · Tobacco smoking status:   Never smoker       Social History     Tobacco Use   Smoking Status Never Smoker   Smokeless Tobacco Never Used     Social History     Substance and Sexual Activity   Alcohol Use Yes    Comment: social       Labs & Results:  Below is the patient's most recent value for Albumin, ALT, AST, BUN, Calcium, Chloride, Cholesterol, CO2, Creatinine, GFR, Glucose, HDL, Hematocrit, Hemoglobin, Hemoglobin A1C, LDL, Magnesium, Phosphorus, Platelets, Potassium, PSA, Sodium, Triglycerides, and WBC  No results found for: ALT, AST, BUN, CALCIUM, CL, CHOL, CO2, CREATININE, GFRAA, GFRNONAA, HDL, HCT, HGB, HGBA1C, LDL, MG, PHOS, PLT, K, PSA, NA, TRIG, WBC  Note: for a comprehensive list of the patient's lab results, access the Results Review activity  Cardiac testing:   ECG performed in the office and reviewed by myself reveals sinus rhythm with occasional PVC  ASSESSMENT/PLAN:  1  Atrial flutter  -patient reports being diagnosed with atrial arrhythmias since 2008  -had seen a cardiologist at [place] for her atrial arrhythmias  -also reports having cardioversion in 2018 and was recommended ablation but patient refused  -currently maintained on flecainide and metoprolol the patient is unaware of the dose of flecainide  -she had been quiet but the over the past year she has been noticing palpitations  -palpitations are now more frequent and occurring daily  -she describes them having as 'skipped heartbeats'rather than rapid heartbeats  -she is also planning to move to [place] at the end of the month  -recommended that we can diagnose the rhythm by up Zio patch that she will wear for 2 weeks  -will let her know through phone the results of the Zio patch  -she may need to establish care at a local cardiologist in [place]   -continue apixaban, metoprolol and flecainide    2  Hypertension  -normotensive in the office  -continue metoprolol    3  Vertigo  -maintain on meclizine    Discussion:  Ms Tianna Price he is a 60-year-old woman with vertigo, hypertension and GERD who has been having fluttering sensation almost on a daily basis for the past year  Her cardiologist at [place] has now left  She presents for follow-up  She describes her episodes as more of a skipped heartbeat  We will obtain 2 week Zio patch to further evaluate the burden of either PVC or atrial arrhythmias  She is moving to [place] at the end of this month  I advised her to see cardiology care down there  I will let her know by phone the results of the Zio patch  Thank you for allowing me to participate in the care of this patient  Please do not hesitate to contact me for any questions        Scribe Attestation    I,:   Danna Last am acting as a scribe while in the presence of the attending physician :        I,:   Mathew Salguero MD personally performed the services described in this documentation    as scribed in my presence :

## 2020-08-07 ENCOUNTER — TELEPHONE (OUTPATIENT)
Dept: INTERNAL MEDICINE CLINIC | Facility: CLINIC | Age: 70
End: 2020-08-07

## 2020-08-07 ENCOUNTER — TELEPHONE (OUTPATIENT)
Dept: OTHER | Facility: OTHER | Age: 70
End: 2020-08-07

## 2020-08-10 ENCOUNTER — CLINICAL SUPPORT (OUTPATIENT)
Dept: CARDIOLOGY CLINIC | Facility: CLINIC | Age: 70
End: 2020-08-10
Payer: COMMERCIAL

## 2020-08-10 ENCOUNTER — NURSE TRIAGE (OUTPATIENT)
Dept: OTHER | Facility: OTHER | Age: 70
End: 2020-08-10

## 2020-08-10 DIAGNOSIS — I48.92 ATRIAL FLUTTER, UNSPECIFIED TYPE (HCC): ICD-10-CM

## 2020-08-10 PROCEDURE — 0298T PR EXT ECG > 48HR TO 21 DAY REVIEW AND INTERPRETATN: CPT | Performed by: INTERNAL MEDICINE

## 2020-08-11 ENCOUNTER — TRANSCRIBE ORDERS (OUTPATIENT)
Dept: LAB | Facility: CLINIC | Age: 70
End: 2020-08-11

## 2020-08-11 ENCOUNTER — TELEMEDICINE (OUTPATIENT)
Dept: INTERNAL MEDICINE CLINIC | Facility: CLINIC | Age: 70
End: 2020-08-11
Payer: COMMERCIAL

## 2020-08-11 DIAGNOSIS — U07.1 COVID-19: ICD-10-CM

## 2020-08-11 DIAGNOSIS — U07.1 COVID-19: Primary | ICD-10-CM

## 2020-08-11 PROCEDURE — 1160F RVW MEDS BY RX/DR IN RCRD: CPT | Performed by: INTERNAL MEDICINE

## 2020-08-11 PROCEDURE — U0003 INFECTIOUS AGENT DETECTION BY NUCLEIC ACID (DNA OR RNA); SEVERE ACUTE RESPIRATORY SYNDROME CORONAVIRUS 2 (SARS-COV-2) (CORONAVIRUS DISEASE [COVID-19]), AMPLIFIED PROBE TECHNIQUE, MAKING USE OF HIGH THROUGHPUT TECHNOLOGIES AS DESCRIBED BY CMS-2020-01-R: HCPCS | Performed by: INTERNAL MEDICINE

## 2020-08-11 PROCEDURE — 99213 OFFICE O/P EST LOW 20 MIN: CPT | Performed by: INTERNAL MEDICINE

## 2020-08-11 NOTE — TELEPHONE ENCOUNTER
Reason for Disposition   COVID-19 Prevention and Healthy Living, questions about    Answer Assessment - Initial Assessment Questions  1  CLOSE CONTACT: "Who is the person with the confirmed or suspected COVID-19 infection that you were exposed to?"    NA NO EXPOSURE BUT WOULD LIKE TESTING  2  PLACE of CONTACT: "Where were you when you were exposed to COVID-19?" (e g , home, school, medical waiting room; which city?)      NO  3  TYPf CONTACT: "How much contact was there?" (e g , sitting next to, live in same house, work in same office, same building)      [facility]  4  DURATION of CONTACT: "How long were you in contact with the COVID-19 patient?" (e g , a few seconds, passed by person, a few minutes, live with the patient)     NA  5  DATE of CONTACT: "When did you have contact with a COVID-19 patient?" (e g , how many days ago)      NA  6  TRAVEL: "Have you traveled out of the country recently?" If so, "When and where?"      * Also ask about out-of-state travel, since the CDC has identified some high-risk cities for community spread in the [address] [place] [address] [address]  * Note: Travel becomes less relevant if there is widespread community transmission where the patient lives  NA  7  COMMUNITY SPREAD: "Are there lots of cases of COVID-19 (community spread) where you live?" (See public health department website, if unsure)        NA  8  SYMPTOMS: "Do you have any symptoms?" (e g , fever, cough, breathing difficulty)       9  PREGNANCY OR POSTPARTUM: "Is there any chance you are pregnant?" "When was your last menstrual period?" "Did you deliver in the last 2 weeks?"     NA  10  HIGH RISK: "Do you have any heart or lung problems?  Do you have a weak immune system?" (e g , CHF, COPD, asthma, HIV positive, chemotherapy, renal failure, diabetes mellitus, sickle cell anemia)        HEART PROBLEMS IRREGULAR HEARTBEAT    Protocols used: CORONAVIRUS (COVID-19) EXPOSURE-ADULT-

## 2020-08-11 NOTE — TELEPHONE ENCOUNTER
Regarding: COVID - traveling   ----- Message from Shine Patino sent at 8/10/2020  9:23 PM EDT -----  " I called my office to obtain a COVID test but they are not answering   I need a COVID test because I will be traveling next Wednesday to [place], no symptoms"

## 2020-08-11 NOTE — PROGRESS NOTES
Virtual Regular Visit      Assessment/Plan:    Problem List Items Addressed This Visit     None               Reason for visit is No chief complaint on file  Encounter provider Flori Vaughan MD    Provider located at [address] [place] [address] [address] [facility] [id number] [phone]      Recent Visits  Date Type Provider Dept   08/07/20 Telephone [address] Internal Med   Showing recent visits within past 7 days and meeting all other requirements     Future Appointments  No visits were found meeting these conditions  Showing future appointments within next 150 days and meeting all other requirements        The patient was identified by name and date of birth  Kev Salcedo was informed that this is a telemedicine visit and that the visit is being conducted through telephone  My office door was closed  No one else was in the room  She acknowledged consent and understanding of privacy and security of the video platform  The patient has agreed to participate and understands they can discontinue the visit at any time  Patient is aware this is a billable service  Subjective  Kev Salcedo is a 77 y o  female who made a virtual visit via telephone to check for COVID screening   Pt stated she wants to check for COVID Ag and Ab as she is going out of State next Wednesday and make sure she doesn't have it  She denies any COVID exposure or sick contact  She denies fever, chills, shortness of breath, or cough  Explained the patient that insurance may not cover for testing since she is asymptomatic  She acknowledged understanding  Instructed to go to [facility] for get the testing done        HPI     Past Medical History:   Diagnosis Date    Atrial flutter ([address] )     Colon polyp     GERD (gastroesophageal reflux disease)     History of echocardiogram 2018    History of Holter monitoring 05/2019    History of stress test     Hypertension     Osteoporosis     Vertigo        Past Surgical History:   Procedure Laterality Date     SECTION      COLONOSCOPY  2012    ECTOPIC PREGNANCY SURGERY      UPPER GASTROINTESTINAL ENDOSCOPY         Current Outpatient Medications   Medication Sig Dispense Refill    apixaban (Eliquis) 5 mg Take 1 tablet (5 mg total) by mouth 2 (two) times a day 180 tablet 0    Ascorbic Acid (VITAMIN C) 500 MG CAPS       Calcium Citrate-Vitamin D 200-125 MG-UNIT TABS       clonazePAM (KlonoPIN) 0 5 MG disintegrating tablet TK 1 T PO Q 2 H PRF SEIZURE AURAS  DO NOT EXCEED 3 TS PER WEEK  2    flecainide (TAMBOCOR) 100 mg tablet Take 100 mg by mouth 2 (two) times a day      lactase (LACTAID) 3,000 units tablet dairy digestive supplement l      lamoTRIgine (LaMICtal) 200 MG tablet lamotrigine 200 mg tablet      levETIRAcetam (KEPPRA) 750 mg tablet Take 750 mg by mouth 2 (two) times a day       meclizine (ANTIVERT) 25 mg tablet 25 mg daily as needed       metoprolol succinate (TOPROL-XL) 50 mg 24 hr tablet Take 1 tablet (50 mg total) by mouth daily 90 tablet 0    Multiple Vitamins-Minerals (ONE-A-DAY WOMENS 50+ ADVANTAGE PO)       Vitamins A & D (VITAMIN A & D) 47408-905 units TABS        No current facility-administered medications for this visit  No Known Allergies    Review of Systems    Video Exam    There were no vitals filed for this visit  Physical Exam     I spent 20 minutes directly with the patient during this visit  VIRTUAL VISIT DISCLAIMER    Michael Hernandez acknowledges that she has consented to an online visit or consultation  She understands that the online visit is based solely on information provided by her, and that, in the absence of a face-to-face physical evaluation by the physician, the diagnosis she receives is both limited and provisional in terms of accuracy and completeness   This is not intended to replace a full medical face-to-face evaluation by the physician  Neymar Lang understands and accepts these terms

## 2020-08-11 NOTE — TELEPHONE ENCOUNTER
PATIENT CALLED AND IS TRAVELING OUT OF STATE , WOULD LIKE COVID TESTING & ANTIBODY TESTING ASAP  VIRTUAL APPOINTMENT SET UP  I HAD DIFFICULTY FINDING AN APPOINTMENT WITH THE PATIENTS USUAL PRIMARY OR ANY MD FOR [facility]  I ASKED THE PATIENT TO CALL THE OFFICE IN THE AM TO CONFIRM HER APPOINTMENT

## 2020-08-12 LAB — SARS-COV-2 RNA SPEC QL NAA+PROBE: NOT DETECTED

## 2020-10-05 DIAGNOSIS — R56.9 SEIZURE (HCC): Primary | ICD-10-CM

## 2020-10-09 DIAGNOSIS — I10 HYPERTENSION, ESSENTIAL: ICD-10-CM

## 2020-10-09 DIAGNOSIS — I48.92 ATRIAL FLUTTER, UNSPECIFIED TYPE (HCC): ICD-10-CM

## 2020-10-09 DIAGNOSIS — R56.9 SEIZURES (HCC): Primary | ICD-10-CM

## 2020-10-15 ENCOUNTER — TELEPHONE (OUTPATIENT)
Dept: OTHER | Facility: OTHER | Age: 70
End: 2020-10-15

## 2020-10-15 DIAGNOSIS — G40.909 SEIZURE DISORDER (HCC): Primary | ICD-10-CM

## 2020-10-15 DIAGNOSIS — I48.92 ATRIAL FLUTTER, UNSPECIFIED TYPE (HCC): ICD-10-CM

## 2020-10-16 RX ORDER — LAMOTRIGINE 200 MG/1
200 TABLET ORAL DAILY
Qty: 90 TABLET | Refills: 0 | Status: CANCELLED | OUTPATIENT
Start: 2020-10-16

## 2020-10-16 RX ORDER — LEVETIRACETAM 750 MG/1
750 TABLET ORAL 2 TIMES DAILY
Qty: 180 TABLET | Refills: 0 | Status: CANCELLED | OUTPATIENT
Start: 2020-10-16

## 2020-10-22 RX ORDER — METOPROLOL SUCCINATE 50 MG/1
50 TABLET, EXTENDED RELEASE ORAL DAILY
Qty: 90 TABLET | Refills: 0 | Status: SHIPPED | OUTPATIENT
Start: 2020-10-22 | End: 2021-03-11

## 2020-10-22 RX ORDER — LEVETIRACETAM 750 MG/1
750 TABLET ORAL 2 TIMES DAILY
Qty: 180 TABLET | Refills: 0 | Status: SHIPPED | OUTPATIENT
Start: 2020-10-22 | End: 2021-01-20

## 2020-10-27 RX ORDER — LAMOTRIGINE 200 MG/1
200 TABLET ORAL DAILY
Qty: 30 TABLET | Refills: 3 | Status: SHIPPED | OUTPATIENT
Start: 2020-10-27 | End: 2020-11-26

## 2021-03-10 DIAGNOSIS — I10 HYPERTENSION, ESSENTIAL: ICD-10-CM

## 2021-03-11 RX ORDER — METOPROLOL SUCCINATE 50 MG/1
TABLET, EXTENDED RELEASE ORAL
Qty: 90 TABLET | Refills: 0 | Status: SHIPPED | OUTPATIENT
Start: 2021-03-11

## 2025-03-10 ENCOUNTER — TELEPHONE (OUTPATIENT)
Dept: GASTROENTEROLOGY | Facility: CLINIC | Age: 75
End: 2025-03-10